# Patient Record
Sex: MALE | Race: WHITE | Employment: FULL TIME | URBAN - NONMETROPOLITAN AREA
[De-identification: names, ages, dates, MRNs, and addresses within clinical notes are randomized per-mention and may not be internally consistent; named-entity substitution may affect disease eponyms.]

---

## 2017-01-01 ENCOUNTER — HOSPITAL ENCOUNTER (OUTPATIENT)
Dept: PHARMACY | Age: 57
Discharge: HOME | End: 2017-11-15

## 2017-01-01 ENCOUNTER — HOSPITAL ENCOUNTER (OUTPATIENT)
Dept: PHARMACY | Age: 57
Setting detail: THERAPIES SERIES
Discharge: HOME | End: 2017-09-22
Payer: MEDICARE

## 2017-01-01 ENCOUNTER — HOSPITAL ENCOUNTER (OUTPATIENT)
Dept: PHARMACY | Age: 57
Setting detail: THERAPIES SERIES
Discharge: HOME | End: 2017-09-08
Payer: MEDICARE

## 2017-01-01 ENCOUNTER — HOSPITAL ENCOUNTER (OUTPATIENT)
Dept: PHARMACY | Age: 57
Setting detail: THERAPIES SERIES
Discharge: HOME | End: 2017-05-22
Payer: MEDICARE

## 2017-01-01 ENCOUNTER — HOSPITAL ENCOUNTER (OUTPATIENT)
Dept: PHARMACY | Age: 57
Setting detail: THERAPIES SERIES
Discharge: HOME | End: 2017-08-22
Payer: MEDICARE

## 2017-01-01 ENCOUNTER — HOSPITAL ENCOUNTER (OUTPATIENT)
Dept: PHARMACY | Age: 57
Setting detail: THERAPIES SERIES
Discharge: HOME | End: 2017-10-11
Payer: MEDICARE

## 2017-01-01 ENCOUNTER — HOSPITAL ENCOUNTER (OUTPATIENT)
Dept: PHARMACY | Age: 57
Setting detail: THERAPIES SERIES
Discharge: HOME | End: 2017-04-28
Payer: MEDICARE

## 2017-01-01 ENCOUNTER — HOSPITAL ENCOUNTER (OUTPATIENT)
Dept: PHARMACY | Age: 57
Setting detail: THERAPIES SERIES
Discharge: HOME | End: 2017-12-13
Payer: MEDICARE

## 2017-01-01 ENCOUNTER — HOSPITAL ENCOUNTER (OUTPATIENT)
Dept: PHARMACY | Age: 57
Discharge: HOME | End: 2017-12-06

## 2017-01-01 ENCOUNTER — HOSPITAL ENCOUNTER (OUTPATIENT)
Dept: PHARMACY | Age: 57
Setting detail: THERAPIES SERIES
Discharge: HOME | End: 2017-09-12
Payer: MEDICARE

## 2017-01-01 ENCOUNTER — HOSPITAL ENCOUNTER (OUTPATIENT)
Dept: PHARMACY | Age: 57
Setting detail: THERAPIES SERIES
Discharge: HOME | End: 2017-11-22
Payer: MEDICARE

## 2017-01-01 ENCOUNTER — TELEPHONE (OUTPATIENT)
Dept: PHARMACY | Age: 57
End: 2017-01-01

## 2017-01-01 ENCOUNTER — HOSPITAL ENCOUNTER (OUTPATIENT)
Dept: PHARMACY | Age: 57
Setting detail: THERAPIES SERIES
Discharge: HOME | End: 2017-09-29
Payer: MEDICARE

## 2017-01-01 ENCOUNTER — HOSPITAL ENCOUNTER (OUTPATIENT)
Age: 57
Discharge: HOME | End: 2017-09-25
Payer: MEDICARE

## 2017-01-01 ENCOUNTER — HOSPITAL ENCOUNTER (OUTPATIENT)
Dept: PHARMACY | Age: 57
Setting detail: THERAPIES SERIES
Discharge: HOME | End: 2017-05-01
Payer: MEDICARE

## 2017-01-01 ENCOUNTER — HOSPITAL ENCOUNTER (OUTPATIENT)
Dept: PHARMACY | Age: 57
Discharge: HOME | End: 2017-08-29

## 2017-01-01 ENCOUNTER — HOSPITAL ENCOUNTER (OUTPATIENT)
Dept: PHARMACY | Age: 57
Setting detail: THERAPIES SERIES
Discharge: HOME | End: 2017-09-27
Payer: MEDICARE

## 2017-01-01 ENCOUNTER — HOSPITAL ENCOUNTER (OUTPATIENT)
Dept: PHARMACY | Age: 57
Setting detail: THERAPIES SERIES
Discharge: HOME | End: 2017-06-29
Payer: MEDICARE

## 2017-01-01 ENCOUNTER — HOSPITAL ENCOUNTER (OUTPATIENT)
Dept: PHARMACY | Age: 57
Discharge: HOME | End: 2017-11-08

## 2017-01-01 ENCOUNTER — HOSPITAL ENCOUNTER (OUTPATIENT)
Dept: PHARMACY | Age: 57
Discharge: HOME | End: 2017-08-17

## 2017-01-01 ENCOUNTER — HOSPITAL ENCOUNTER (OUTPATIENT)
Dept: PHARMACY | Age: 57
Setting detail: THERAPIES SERIES
Discharge: HOME | End: 2017-10-19
Payer: MEDICARE

## 2017-01-01 ENCOUNTER — HOSPITAL ENCOUNTER (OUTPATIENT)
Dept: PHARMACY | Age: 57
Setting detail: THERAPIES SERIES
Discharge: HOME | End: 2017-04-27
Payer: MEDICARE

## 2017-01-01 ENCOUNTER — HOSPITAL ENCOUNTER (OUTPATIENT)
Dept: PHARMACY | Age: 57
Setting detail: THERAPIES SERIES
Discharge: HOME | End: 2017-03-23
Payer: MEDICARE

## 2017-01-01 ENCOUNTER — HOSPITAL ENCOUNTER (OUTPATIENT)
Dept: PHARMACY | Age: 57
Setting detail: THERAPIES SERIES
Discharge: HOME | End: 2017-09-15
Payer: MEDICARE

## 2017-01-01 ENCOUNTER — HOSPITAL ENCOUNTER (OUTPATIENT)
Dept: PHARMACY | Age: 57
Setting detail: THERAPIES SERIES
Discharge: HOME | End: 2017-11-01
Payer: MEDICARE

## 2017-01-01 ENCOUNTER — HOSPITAL ENCOUNTER (OUTPATIENT)
Dept: PHARMACY | Age: 57
Setting detail: THERAPIES SERIES
Discharge: HOME | End: 2017-09-18
Payer: MEDICARE

## 2017-01-01 ENCOUNTER — OFFICE VISIT (OUTPATIENT)
Dept: CARDIOLOGY CLINIC | Age: 57
End: 2017-01-01
Payer: MEDICARE

## 2017-01-01 ENCOUNTER — HOSPITAL ENCOUNTER (OUTPATIENT)
Age: 57
Discharge: HOME | End: 2017-09-16
Payer: MEDICARE

## 2017-01-01 ENCOUNTER — HOSPITAL ENCOUNTER (OUTPATIENT)
Dept: PHARMACY | Age: 57
Setting detail: THERAPIES SERIES
Discharge: HOME | End: 2017-12-20
Payer: MEDICARE

## 2017-01-01 ENCOUNTER — HOSPITAL ENCOUNTER (OUTPATIENT)
Dept: PHARMACY | Age: 57
Setting detail: THERAPIES SERIES
Discharge: HOME | End: 2017-08-07
Payer: MEDICARE

## 2017-01-01 ENCOUNTER — HOSPITAL ENCOUNTER (OUTPATIENT)
Dept: PHARMACY | Age: 57
Setting detail: THERAPIES SERIES
Discharge: HOME | End: 2017-11-29
Payer: MEDICARE

## 2017-01-01 ENCOUNTER — HOSPITAL ENCOUNTER (OUTPATIENT)
Dept: PHARMACY | Age: 57
Setting detail: THERAPIES SERIES
Discharge: HOME | End: 2017-12-27
Payer: MEDICARE

## 2017-01-01 ENCOUNTER — HOSPITAL ENCOUNTER (OUTPATIENT)
Age: 57
Setting detail: SPECIMEN
Discharge: HOME | End: 2017-10-24
Payer: MEDICARE

## 2017-01-01 ENCOUNTER — HOSPITAL ENCOUNTER (OUTPATIENT)
Dept: PHARMACY | Age: 57
Setting detail: THERAPIES SERIES
Discharge: HOME | End: 2017-10-25
Payer: MEDICARE

## 2017-01-01 ENCOUNTER — HOSPITAL ENCOUNTER (OUTPATIENT)
Dept: PHARMACY | Age: 57
Setting detail: THERAPIES SERIES
Discharge: HOME | End: 2017-10-04
Payer: MEDICARE

## 2017-01-01 ENCOUNTER — HOSPITAL ENCOUNTER (OUTPATIENT)
Dept: PHARMACY | Age: 57
Setting detail: THERAPIES SERIES
Discharge: HOME | End: 2017-06-08
Payer: MEDICARE

## 2017-01-01 VITALS
BODY MASS INDEX: 27.34 KG/M2 | DIASTOLIC BLOOD PRESSURE: 76 MMHG | SYSTOLIC BLOOD PRESSURE: 134 MMHG | HEART RATE: 72 BPM | WEIGHT: 196 LBS

## 2017-01-01 VITALS
WEIGHT: 212.4 LBS | HEART RATE: 72 BPM | DIASTOLIC BLOOD PRESSURE: 76 MMHG | SYSTOLIC BLOOD PRESSURE: 134 MMHG | BODY MASS INDEX: 27.95 KG/M2 | BODY MASS INDEX: 29.62 KG/M2 | HEART RATE: 74 BPM | WEIGHT: 200.4 LBS | SYSTOLIC BLOOD PRESSURE: 126 MMHG | DIASTOLIC BLOOD PRESSURE: 74 MMHG

## 2017-01-01 VITALS
HEART RATE: 76 BPM | BODY MASS INDEX: 27.48 KG/M2 | SYSTOLIC BLOOD PRESSURE: 110 MMHG | DIASTOLIC BLOOD PRESSURE: 60 MMHG | OXYGEN SATURATION: 97 % | WEIGHT: 197 LBS

## 2017-01-01 VITALS
WEIGHT: 210 LBS | SYSTOLIC BLOOD PRESSURE: 120 MMHG | DIASTOLIC BLOOD PRESSURE: 60 MMHG | HEART RATE: 80 BPM | OXYGEN SATURATION: 98 % | BODY MASS INDEX: 29.29 KG/M2

## 2017-01-01 VITALS
BODY MASS INDEX: 29.82 KG/M2 | WEIGHT: 213.8 LBS | SYSTOLIC BLOOD PRESSURE: 142 MMHG | HEART RATE: 76 BPM | DIASTOLIC BLOOD PRESSURE: 76 MMHG

## 2017-01-01 VITALS — WEIGHT: 203.6 LBS | BODY MASS INDEX: 28.4 KG/M2

## 2017-01-01 VITALS
BODY MASS INDEX: 28.93 KG/M2 | HEART RATE: 84 BPM | DIASTOLIC BLOOD PRESSURE: 78 MMHG | WEIGHT: 207.4 LBS | SYSTOLIC BLOOD PRESSURE: 146 MMHG

## 2017-01-01 VITALS
HEART RATE: 86 BPM | WEIGHT: 193 LBS | BODY MASS INDEX: 26.92 KG/M2 | SYSTOLIC BLOOD PRESSURE: 112 MMHG | DIASTOLIC BLOOD PRESSURE: 70 MMHG

## 2017-01-01 DIAGNOSIS — Z79.01 LONG TERM CURRENT USE OF ANTICOAGULANT THERAPY: ICD-10-CM

## 2017-01-01 DIAGNOSIS — I05.9 MITRAL VALVE DISORDER: ICD-10-CM

## 2017-01-01 DIAGNOSIS — I48.20 CHRONIC ATRIAL FIBRILLATION (HCC): ICD-10-CM

## 2017-01-01 DIAGNOSIS — E55.9 VITAMIN D DEFICIENCY DISEASE: ICD-10-CM

## 2017-01-01 DIAGNOSIS — I05.9 MITRAL VALVE DISEASE: ICD-10-CM

## 2017-01-01 DIAGNOSIS — E78.5 HYPERLIPIDEMIA, UNSPECIFIED HYPERLIPIDEMIA TYPE: Primary | ICD-10-CM

## 2017-01-01 DIAGNOSIS — Z95.810 AUTOMATIC IMPLANTABLE CARDIOVERTER-DEFIBRILLATOR IN SITU: ICD-10-CM

## 2017-01-01 DIAGNOSIS — E78.5 HYPERLIPIDEMIA, UNSPECIFIED HYPERLIPIDEMIA TYPE: ICD-10-CM

## 2017-01-01 DIAGNOSIS — Z79.01 LONG TERM (CURRENT) USE OF ANTICOAGULANTS: ICD-10-CM

## 2017-01-01 DIAGNOSIS — I48.20 CHRONIC ATRIAL FIBRILLATION (HCC): Primary | ICD-10-CM

## 2017-01-01 LAB
ABSOLUTE EOS #: 0.3 K/UL (ref 0–0.4)
ABSOLUTE LYMPH #: 1.4 K/UL (ref 1–4.8)
ABSOLUTE MONO #: 0.7 K/UL (ref 0–1)
ALBUMIN SERPL-MCNC: 3.4 G/DL (ref 3.5–5.2)
ALBUMIN SERPL-MCNC: 3.7 G/DL (ref 3.5–5.2)
ALBUMIN/GLOBULIN RATIO: ABNORMAL (ref 1–2.5)
ALP BLD-CCNC: 95 U/L (ref 40–129)
ALT SERPL-CCNC: 6 U/L (ref 5–41)
ANION GAP SERPL CALCULATED.3IONS-SCNC: 10 MMOL/L (ref 9–17)
ANION GAP SERPL CALCULATED.3IONS-SCNC: 10 MMOL/L (ref 9–17)
ANION GAP SERPL CALCULATED.3IONS-SCNC: 15 MMOL/L (ref 9–17)
AST SERPL-CCNC: 20 U/L
BASOPHILS # BLD: 0 %
BASOPHILS ABSOLUTE: 0 K/UL (ref 0–0.2)
BILIRUB SERPL-MCNC: 0.41 MG/DL (ref 0.3–1.2)
BUN BLDV-MCNC: 20 MG/DL (ref 6–20)
BUN BLDV-MCNC: 4 MG/DL (ref 6–20)
BUN BLDV-MCNC: 6 MG/DL (ref 6–20)
BUN/CREAT BLD: 23 (ref 9–20)
BUN/CREAT BLD: 7 (ref 9–20)
CALCIUM SERPL-MCNC: 9 MG/DL (ref 8.6–10.4)
CALCIUM SERPL-MCNC: 9.1 MG/DL (ref 8.6–10.4)
CALCIUM SERPL-MCNC: 9.8 MG/DL (ref 8.6–10.4)
CHLORIDE BLD-SCNC: 83 MMOL/L (ref 98–107)
CHLORIDE BLD-SCNC: 87 MMOL/L (ref 98–107)
CHLORIDE BLD-SCNC: 89 MMOL/L (ref 98–107)
CHOLESTEROL/HDL RATIO: 4.9
CHOLESTEROL: 138 MG/DL
CO2: 29 MMOL/L (ref 20–31)
CO2: 31 MMOL/L (ref 20–31)
CO2: 34 MMOL/L (ref 20–31)
CREAT SERPL-MCNC: 0.56 MG/DL (ref 0.7–1.2)
CREAT SERPL-MCNC: 0.57 MG/DL (ref 0.7–1.2)
CREAT SERPL-MCNC: 0.86 MG/DL (ref 0.7–1.2)
CREATININE URINE: 17.8 MG/DL (ref 39–259)
DIFFERENTIAL TYPE: YES
EKG ATRIAL RATE: 69 BPM
EKG Q-T INTERVAL: 430 MS
EKG QRS DURATION: 112 MS
EKG QTC CALCULATION (BAZETT): 460 MS
EKG T AXIS: 52 DEGREES
EKG VENTRICULAR RATE: 69 BPM
EOSINOPHILS RELATIVE PERCENT: 4 %
GFR AFRICAN AMERICAN: >60 ML/MIN
GFR NON-AFRICAN AMERICAN: >60 ML/MIN
GFR SERPL CREATININE-BSD FRML MDRD: ABNORMAL ML/MIN/{1.73_M2}
GLUCOSE BLD-MCNC: 101 MG/DL (ref 70–99)
GLUCOSE BLD-MCNC: 86 MG/DL (ref 70–99)
HCT VFR BLD CALC: 37.4 % (ref 41–53)
HDLC SERPL-MCNC: 28 MG/DL
HEMOGLOBIN: 12.6 G/DL (ref 13.5–17.5)
INR BLD: 1.2
INR BLD: 1.6
INR BLD: 1.7
INR BLD: 1.8
INR BLD: 2
INR BLD: 2
INR BLD: 2.2
INR BLD: 2.28
INR BLD: 2.3
INR BLD: 2.3
INR BLD: 2.4
INR BLD: 3.9
INR BLD: 4.09
INR BLD: 5.8
INR BLD: 7.8
INTERNATIONAL NORMALIZATION RATIO, POC: 1.2
INTERNATIONAL NORMALIZATION RATIO, POC: 1.3
INTERNATIONAL NORMALIZATION RATIO, POC: 1.42
INTERNATIONAL NORMALIZATION RATIO, POC: 1.42
INTERNATIONAL NORMALIZATION RATIO, POC: 1.6
INTERNATIONAL NORMALIZATION RATIO, POC: 1.6
INTERNATIONAL NORMALIZATION RATIO, POC: 1.7
INTERNATIONAL NORMALIZATION RATIO, POC: 1.7
INTERNATIONAL NORMALIZATION RATIO, POC: 1.9
INTERNATIONAL NORMALIZATION RATIO, POC: 2.3
INTERNATIONAL NORMALIZATION RATIO, POC: 2.3
INTERNATIONAL NORMALIZATION RATIO, POC: 2.5
INTERNATIONAL NORMALIZATION RATIO, POC: 2.7
INTERNATIONAL NORMALIZATION RATIO, POC: 2.7
INTERNATIONAL NORMALIZATION RATIO, POC: 2.8
INTERNATIONAL NORMALIZATION RATIO, POC: 2.9
INTERNATIONAL NORMALIZATION RATIO, POC: 2.9
INTERNATIONAL NORMALIZATION RATIO, POC: 5.4
LDL CHOLESTEROL: 84 MG/DL (ref 0–130)
LYMPHOCYTES # BLD: 22 %
MAGNESIUM: 1.9 MG/DL (ref 1.6–2.6)
MCH RBC QN AUTO: 33.3 PG (ref 26–34)
MCHC RBC AUTO-ENTMCNC: 33.8 G/DL (ref 31–37)
MCV RBC AUTO: 98.3 FL (ref 80–100)
MONOCYTES # BLD: 11 %
OSMOLALITY URINE: 125 MOSM/KG (ref 80–1300)
PATIENT FASTING?: YES
PDW BLD-RTO: 13.4 % (ref 12.1–15.2)
PHOSPHORUS: 3.3 MG/DL (ref 2.5–4.5)
PLATELET # BLD: 288 K/UL (ref 140–450)
PLATELET ESTIMATE: ABNORMAL
PMV BLD AUTO: ABNORMAL FL (ref 6–12)
POTASSIUM SERPL-SCNC: 3.6 MMOL/L (ref 3.7–5.3)
POTASSIUM SERPL-SCNC: 3.9 MMOL/L (ref 3.7–5.3)
POTASSIUM SERPL-SCNC: 4.8 MMOL/L (ref 3.7–5.3)
PROTHROMBIN TIME, POC: 15.1
RBC # BLD: 3.8 M/UL (ref 4.5–5.9)
RBC # BLD: ABNORMAL 10*6/UL
SEG NEUTROPHILS: 63 %
SEGMENTED NEUTROPHILS ABSOLUTE COUNT: 4.1 K/UL (ref 2.1–6.5)
SERUM OSMOLALITY: 282 MOSM/KG (ref 275–295)
SODIUM BLD-SCNC: 127 MMOL/L (ref 135–144)
SODIUM BLD-SCNC: 130 MMOL/L (ref 135–144)
SODIUM BLD-SCNC: 131 MMOL/L (ref 135–144)
SODIUM,UR: 21 MMOL/L
TOTAL PROTEIN: 7.5 G/DL (ref 6.4–8.3)
TRIGL SERPL-MCNC: 131 MG/DL
TSH SERPL DL<=0.05 MIU/L-ACNC: 1.37 MIU/L (ref 0.3–5)
VITAMIN D 25-HYDROXY: 48.6 NG/ML (ref 30–100)
VLDLC SERPL CALC-MCNC: ABNORMAL MG/DL (ref 1–30)
WBC # BLD: 6.5 K/UL (ref 3.5–11)
WBC # BLD: ABNORMAL 10*3/UL

## 2017-01-01 PROCEDURE — 82310 ASSAY OF CALCIUM: CPT

## 2017-01-01 PROCEDURE — 83735 ASSAY OF MAGNESIUM: CPT

## 2017-01-01 PROCEDURE — 93005 ELECTROCARDIOGRAM TRACING: CPT

## 2017-01-01 PROCEDURE — 82306 VITAMIN D 25 HYDROXY: CPT

## 2017-01-01 PROCEDURE — 85610 PROTHROMBIN TIME: CPT

## 2017-01-01 PROCEDURE — 80061 LIPID PANEL: CPT

## 2017-01-01 PROCEDURE — 36415 COLL VENOUS BLD VENIPUNCTURE: CPT

## 2017-01-01 PROCEDURE — 80053 COMPREHEN METABOLIC PANEL: CPT

## 2017-01-01 PROCEDURE — 99214 OFFICE O/P EST MOD 30 MIN: CPT | Performed by: INTERNAL MEDICINE

## 2017-01-01 PROCEDURE — 3017F COLORECTAL CA SCREEN DOC REV: CPT | Performed by: INTERNAL MEDICINE

## 2017-01-01 PROCEDURE — 93289 INTERROG DEVICE EVAL HEART: CPT | Performed by: INTERNAL MEDICINE

## 2017-01-01 PROCEDURE — 82570 ASSAY OF URINE CREATININE: CPT

## 2017-01-01 PROCEDURE — 80051 ELECTROLYTE PANEL: CPT

## 2017-01-01 PROCEDURE — 83930 ASSAY OF BLOOD OSMOLALITY: CPT

## 2017-01-01 PROCEDURE — 84100 ASSAY OF PHOSPHORUS: CPT

## 2017-01-01 PROCEDURE — 84520 ASSAY OF UREA NITROGEN: CPT

## 2017-01-01 PROCEDURE — G0463 HOSPITAL OUTPT CLINIC VISIT: HCPCS

## 2017-01-01 PROCEDURE — 99211 OFF/OP EST MAY X REQ PHY/QHP: CPT

## 2017-01-01 PROCEDURE — 82040 ASSAY OF SERUM ALBUMIN: CPT

## 2017-01-01 PROCEDURE — 84443 ASSAY THYROID STIM HORMONE: CPT

## 2017-01-01 PROCEDURE — 85025 COMPLETE CBC W/AUTO DIFF WBC: CPT

## 2017-01-01 PROCEDURE — 83935 ASSAY OF URINE OSMOLALITY: CPT

## 2017-01-01 PROCEDURE — 84300 ASSAY OF URINE SODIUM: CPT

## 2017-01-01 PROCEDURE — 4004F PT TOBACCO SCREEN RCVD TLK: CPT | Performed by: INTERNAL MEDICINE

## 2017-01-01 PROCEDURE — 80048 BASIC METABOLIC PNL TOTAL CA: CPT

## 2017-01-01 PROCEDURE — G8427 DOCREV CUR MEDS BY ELIG CLIN: HCPCS | Performed by: INTERNAL MEDICINE

## 2017-01-01 PROCEDURE — 82565 ASSAY OF CREATININE: CPT

## 2017-01-01 PROCEDURE — G8417 CALC BMI ABV UP PARAM F/U: HCPCS | Performed by: INTERNAL MEDICINE

## 2017-01-01 RX ORDER — WARFARIN SODIUM 4 MG/1
4 TABLET ORAL SEE ADMIN INSTRUCTIONS
COMMUNITY
End: 2017-01-01 | Stop reason: DRUGHIGH

## 2017-01-01 RX ORDER — POLYETHYLENE GLYCOL 3350 17 G/17G
17 POWDER, FOR SOLUTION ORAL DAILY
COMMUNITY
End: 2017-01-01 | Stop reason: ALTCHOICE

## 2017-01-01 RX ORDER — WARFARIN SODIUM 4 MG/1
TABLET ORAL
Qty: 30 TABLET | Refills: 0 | Status: SHIPPED
Start: 2017-01-01

## 2017-01-01 RX ORDER — LEVOTHYROXINE SODIUM 0.05 MG/1
50 TABLET ORAL DAILY
COMMUNITY
Start: 2017-01-01

## 2017-01-01 RX ORDER — FENTANYL 100 UG/H
1 PATCH TRANSDERMAL
COMMUNITY

## 2017-01-01 RX ORDER — MULTIVIT-MIN/IRON/FOLIC ACID/K 18-400-25
1 TABLET ORAL DAILY
COMMUNITY
End: 2017-01-01 | Stop reason: ALTCHOICE

## 2017-01-01 RX ORDER — CLOPIDOGREL BISULFATE 75 MG/1
75 TABLET ORAL DAILY
COMMUNITY

## 2017-01-01 RX ORDER — AMOXICILLIN AND CLAVULANATE POTASSIUM 875; 125 MG/1; MG/1
1 TABLET, FILM COATED ORAL 2 TIMES DAILY
COMMUNITY
Start: 2017-01-01 | End: 2017-01-01

## 2017-01-01 RX ORDER — CEPHALEXIN 500 MG/1
1000 CAPSULE ORAL 2 TIMES DAILY
Refills: 0 | COMMUNITY
Start: 2017-01-01 | End: 2017-01-01 | Stop reason: HOSPADM

## 2017-01-01 RX ORDER — BUMETANIDE 1 MG/1
1 TABLET ORAL 2 TIMES DAILY
COMMUNITY

## 2017-01-01 RX ORDER — EPLERENONE 50 MG/1
25 TABLET, FILM COATED ORAL DAILY
Qty: 30 TABLET | Refills: 3 | Status: SHIPPED | OUTPATIENT
Start: 2017-01-01 | End: 2017-01-01 | Stop reason: ALTCHOICE

## 2017-01-01 RX ORDER — THIAMINE MONONITRATE (VIT B1) 100 MG
100 TABLET ORAL DAILY
COMMUNITY

## 2017-01-01 RX ORDER — DOXAZOSIN 2 MG/1
2 TABLET ORAL DAILY
COMMUNITY

## 2017-01-01 RX ORDER — FLUTICASONE FUROATE AND VILANTEROL 100; 25 UG/1; UG/1
1 POWDER RESPIRATORY (INHALATION) DAILY
COMMUNITY

## 2017-01-01 RX ORDER — CARVEDILOL 25 MG/1
12.5 TABLET ORAL 2 TIMES DAILY WITH MEALS
COMMUNITY

## 2017-01-01 RX ORDER — ACAMPROSATE CALCIUM 333 MG/1
666 TABLET, DELAYED RELEASE ORAL 3 TIMES DAILY
COMMUNITY

## 2017-01-01 RX ORDER — SPIRONOLACTONE 100 MG/1
200 TABLET, FILM COATED ORAL DAILY
Refills: 1 | COMMUNITY
Start: 2017-01-01

## 2017-01-01 RX ORDER — WARFARIN SODIUM 3 MG/1
TABLET ORAL
Qty: 30 TABLET | Refills: 3 | Status: SHIPPED
Start: 2017-01-01 | End: 2017-01-01 | Stop reason: DRUGHIGH

## 2017-01-01 RX ORDER — ASPIRIN 81 MG/1
81 TABLET ORAL DAILY
COMMUNITY

## 2017-01-01 RX ORDER — POTASSIUM CHLORIDE 750 MG/1
10 TABLET, FILM COATED, EXTENDED RELEASE ORAL DAILY
COMMUNITY
End: 2017-01-01 | Stop reason: SDUPTHER

## 2017-01-01 RX ORDER — DOXAZOSIN 2 MG/1
2 TABLET ORAL NIGHTLY
Qty: 30 TABLET | Refills: 3 | Status: SHIPPED | OUTPATIENT
Start: 2017-01-01 | End: 2017-01-01 | Stop reason: ALTCHOICE

## 2017-01-01 RX ORDER — M-VIT,TX,IRON,MINS/CALC/FOLIC 27MG-0.4MG
1 TABLET ORAL DAILY
COMMUNITY

## 2017-01-01 RX ORDER — AMITRIPTYLINE HYDROCHLORIDE 100 MG/1
100 TABLET, FILM COATED ORAL NIGHTLY
COMMUNITY

## 2017-01-01 RX ORDER — SPIRONOLACTONE 25 MG/1
50 TABLET ORAL DAILY
COMMUNITY
End: 2017-01-01 | Stop reason: ALTCHOICE

## 2017-01-01 RX ORDER — FOLIC ACID 1 MG/1
1 TABLET ORAL DAILY
COMMUNITY
Start: 2017-01-01

## 2017-01-01 RX ORDER — WARFARIN SODIUM 4 MG/1
TABLET ORAL
Qty: 90 TABLET | Refills: 1 | Status: SHIPPED
Start: 2017-01-01 | End: 2017-01-01 | Stop reason: SDUPTHER

## 2017-01-01 RX ORDER — MAGNESIUM OXIDE 400 MG/1
400 TABLET ORAL 2 TIMES DAILY
COMMUNITY

## 2017-01-01 RX ORDER — PREDNISONE 20 MG/1
20 TABLET ORAL SEE ADMIN INSTRUCTIONS
COMMUNITY

## 2017-01-01 RX ORDER — NICOTINE 21 MG/24HR
1 PATCH, TRANSDERMAL 24 HOURS TRANSDERMAL EVERY 24 HOURS
COMMUNITY
End: 2017-01-01

## 2017-01-01 RX ORDER — NITROGLYCERIN 0.4 MG/1
0.4 TABLET SUBLINGUAL EVERY 5 MIN PRN
COMMUNITY

## 2017-01-01 RX ORDER — POTASSIUM CHLORIDE 750 MG/1
10 TABLET, FILM COATED, EXTENDED RELEASE ORAL DAILY
Qty: 60 TABLET | Refills: 11 | Status: SHIPPED | OUTPATIENT
Start: 2017-01-01

## 2017-01-01 RX ORDER — SPIRONOLACTONE 50 MG/1
50 TABLET, FILM COATED ORAL DAILY
COMMUNITY
Start: 2017-01-01 | End: 2017-01-01 | Stop reason: ALTCHOICE

## 2017-01-01 RX ORDER — BUPROPION HYDROCHLORIDE 300 MG/1
300 TABLET ORAL DAILY
COMMUNITY
End: 2017-01-01 | Stop reason: ALTCHOICE

## 2017-01-30 ENCOUNTER — NURSE ONLY (OUTPATIENT)
Dept: CARDIOLOGY | Facility: CLINIC | Age: 57
End: 2017-01-30

## 2017-01-30 DIAGNOSIS — Z95.810 CARDIAC DEFIBRILLATOR IN PLACE: Primary | ICD-10-CM

## 2017-01-30 PROCEDURE — 93288 INTERROG EVL PM/LDLS PM IP: CPT | Performed by: INTERNAL MEDICINE

## 2017-09-25 NOTE — MR AVS SNAPSHOT
After Visit Summary             Prashanth Post   2017 12:00 PM   Office Visit    Description:  Male : 1960   Provider:  Ravi Ly MD   Department:  [facility] Cardiology Specialist              Your Follow-Up and Future Appointments         Below is a list of your follow-up and future appointments. This may not be a complete list as you may have made appointments directly with providers that we are not aware of or your providers may have made some for you. Please call your providers to confirm appointments. It is important to keep your appointments. Please bring your current insurance card, photo ID, co-pay, and all medication bottles to your appointment. If self-pay, payment is expected at the time of service. Your To-Do List     Future Appointments Provider Department Dept Phone    2017 5:00 AM [address] [facility] Medication Manangement [phone]    2018 2:00 PM Ravi Ly MD [facility] Cardiology Specialist [phone]    If this is a fasting lab, please do not eat or drink past midnight other than water.     2018 12:30 PM Ravi Ly MD [facility] Cardiology Specialist [phone]    Future Orders Complete By Expires    CBC Auto Differential [[id number] Custom]  10/25/2018 2018    Comprehensive Metabolic Panel [PUW05 Custom]  10/25/2018 2018    EKG 12 Lead [EKG1 Custom]  10/25/2018 2018    Lipid Panel [LAB18 Custom]  10/25/2018 2018    Magnesium [IPD380 Custom]  10/25/2018 2018    TSH with Reflex [[id number] Custom]  10/25/2018 2018    Vitamin D 25 Hydroxy [FPM385 Custom]  10/25/2018 2018    XR CHEST STANDARD (2 VW) [02103 Custom]  10/25/2018 2018         Information from Your Visit        Department     Name Address Phone [facility] Cardiology Specialist [address] [place] [address] [phone]      You Were Seen for:         Comments Hyperlipidemia, unspecified hyperlipidemia type   [2538432]         Vital Signs     Blood Pressure Pulse Weight Oxygen Saturation Body Mass Index Smoking Status    120/60 80 210 lb (95.3 kg) 98% 29.29 kg/m2 Current Every Day Smoker      Additional Information about your Body Mass Index (BMI)           Your BMI as listed above is considered overweight (25.0-29.9). BMI is an estimate of body fat, calculated from your height and weight. The higher your BMI, the greater your risk of heart disease, high blood pressure, type 2 diabetes, stroke, gallstones, arthritis, sleep apnea, and certain cancers. BMI is not perfect. It may overestimate body fat in athletes and people who are more muscular. If your body fat is high you can improve your BMI by decreasing your calorie intake and becoming more physically active.      Learn more at: [email or web address]             Medications and Orders      Your Current Medications Are              bumetanide (BUMEX) 1 MG tablet Take 1 mg by mouth 2 times daily    magnesium oxide (MAG-OX) 400 MG tablet Take 400 mg by mouth 2 times daily    warfarin (COUMADIN) 4 MG tablet Take 4 mg by mouth See Admin Instructions Takes 4 mg every Mon and Friday and 6 mg daily all other days as directed by [facility] - Medication Management    potassium chloride (KLOR-CON) 10 MEQ extended release tablet Take 1 tablet by mouth daily    acamprosate (CAMPRAL) 333 MG tablet Take 666 mg by mouth 3 times daily    Cholecalciferol (VITAMIN D3) 5000 units CAPS Take 1 capsule by mouth Twice a Week On Wednesdays and Sundays    doxazosin (CARDURA) 2 MG tablet Take 2 mg by mouth daily    fluticasone-vilanterol (BREO ELLIPTA) 100-25 MCG/INH AEPB inhaler Inhale 1 puff into the lungs daily    Multiple Vitamins-Minerals (THERAPEUTIC MULTIVITAMIN-MINERALS) tablet Take 1 tablet by mouth daily    nitroGLYCERIN (NITROSTAT) 0.4 MG SL tablet Place 0.4 mg under the tongue 1960 Male White Non-/Non  English      Problem List as of 9/25/2017  Date Reviewed: 7/25/2016                Automatic implantable cardioverter-defibrillator in situ    Hyperlipidemia      Preventive Care        Date Due    Hepatitis C screening is recommended for all adults regardless of risk factors born between [facility] at least once (lifetime) who have never been tested. 1960    HIV screening is recommended for all people regardless of risk factors  aged 15-65 years at least once (lifetime) who have never been HIV tested. 4/20/1973    Tetanus Combination Vaccine (1 - Tdap) 4/20/1977    Pneumococcal Vaccine - Pneumovax for adults aged 19-64 years with: chronic heart disease, chronic lung disease, diabetes mellitus, alcoholism, chronic liver disease, or cigarette smoking. (1 of 1 - PPSV23) 4/20/1977    Colon Cancer Stool Test 6/2/2016    Yearly Flu Vaccine (1) 9/1/2017    Cholesterol Screening 7/7/2021            MyChart Signup           Our records indicate that you have declined MyChart signup.

## 2017-09-29 NOTE — PROGRESS NOTES
Fingerstick INR drawn per patient self-test and result delivered to us via fax. All communication is via telephone with patient's daughter and caregiver, Michael Santos states her dad has not reported any visible blood in urine and no black tarry stool. As noted above, Magdanathan Kc went to see Dr. Alireza Pinto, his PCP, earlier today and will start Wellbutrin  mg daily, Cephalexin 1000 mg BID x 10 days, and restart his SPIRONOLACTONE 100 mg BID (which can decrease INR). Following his INR of 5.4 on 9-, Bethel's warfarin dosage was skipped for the past 2 days as instructed prior to this INR check today. No change in other maintenance medications or in diet, although Rosalba Cira says he is \"back to drinking as much alcohol as he had been previously (which she estimates anywhere from \"20 to 40 beers per day\"). For now, we will restart his warfarin and continue with a weekly dosage comparable to the doses he has taken over the course of the past 7 days. We will have him recheck his INR again in 5 days, on Wednesday, 10-4-2017.

## 2017-10-11 NOTE — PROGRESS NOTES
Fingerstick INR drawn per patient self-test and result delivered to us via fax. All communication is via telephone with patient. Patient states no visible blood in urine and no black tarry stool, and Augusta states Romie Danielle denies any nose bleeds or coughing up any blood. Denies any missed doses of warfarin. No change in diet. Romie Danielle continues on augmentin 875 mg BID x 14 days since discharge from [facility] on 10/3/17. Since Bethel's INR is subtherapeutic again today, we will have him take warfarin 4.5 mg today, then increase current weekly warfarin regimen by 14.3% (his INR target range 2.5 to 3.5) and recheck INR via self-test in 1 week(s).

## 2017-10-19 NOTE — PROGRESS NOTES
Fingerstick INR drawn per patient self-test and result delivered to us via fax. All communication is via telephone with patient's daughter, Zurdo Pompa. Patient states no visible blood in urine and no black tarry stool. Saint Gardener was taken to [facility] ER on 10/15/17 for altered mental status and hyponatremia. Bethel's INR upon admission was 4.09. Bethel missed 3 doses of warfarin on 10/15, 10/16 and 10/18. Saint Gardener was drinking approx. \"15 beers per day before admission\", but hasn't had any since discharge home yesterday according to Zurdo Pompa. Augusta states Saint Gardener has 2 doses of augmentin left and will finish last dose tomorrow AM. Since Bethel's INR is subtherapeutic today and has had 3 missed dose in the last week. We will have him take warfarin 4 mg x 2 doses, then 3 mg daily x 4 doses and recheck INR via self-test in 6 day(s).

## 2017-10-25 NOTE — PATIENT INSTRUCTIONS
Increase current dose of warfarin as instructed on dosing calendar provided. Continue to monitor urine and stool for signs and symptoms of bleeding. Please notify the clinic of any medication changes. Please remember to bring all medications (both prescription and OTC) to your next visit. Kindly notify the clinic if you are unable to make to your next appointment.

## 2017-10-25 NOTE — PROGRESS NOTES
Fingerstick INR drawn per patient self-test and result delivered to us via fax. All communication is via telephone with patient. Patient states no visible blood in urine and no black tarry stool. Denies any missed doses of warfarin. No change in diet. According to Tara Dre has only had \"one tall boy beer,\" since he has been home from the hospital. Augusta states Bethel's \"chest sounded like crap. \" He was prescribed levaquin 750 mg daily and prednisone 60 mg daily x 5 days, and took his last dose of both this morning. Augusta states Guillermo Waterman is better, but seems weak. She states Guillermo Waterman is not eating a lot, but is drinking V8 splash berry blend and Arie's berry juice, about 8 ounces x 8 servings per day. Since Bethel's INR is slightly subtherapeutic, we will have him take warfarin 4 mg tonight and then increase current weekly warfarin regimen by 10% and recheck INR via self-test in 1 week(s).

## 2017-11-08 NOTE — PROGRESS NOTES
Fingerstick INR drawn per patient self-test and result delivered to us via fax. All communication is via telephone with patient's daughter, Berto Ordonez. She denies any reports of visible blood in urine and no black tarry stool. Denies any missed doses of warfarin. All medications reviewed for accuracy. She says that he took his last dose of Levaquin on 11-5-2017 (3 days ago). No change in other maintenance medications or in diet. According to Luiz Aschoff has not been drinking \"as much\" alcohol as he had been previously and estimates that he may have had \"about 18\" beers over the course of the past week. Since we are targeting an INR of 2.5 - 3.5 given PMH significant for MVR, we will increase current weekly warfarin regimen (8%) as compared to the dosage he has taken over the course of the past 7 days. We will have Augusta recheck his INR via self-test again in 1 week, on Wednesday, 11-.

## 2017-11-15 NOTE — PROGRESS NOTES
Fingerstick INR drawn per patient self-test and result delivered to us via fax. All communication is via telephone with patient's daughter, Hedy Chung. She denies any reports of visible blood in urine and no black tarry stool. Denies any missed doses of warfarin. All medications reviewed for accuracy. No change in other maintenance medications or in diet. According to Rosa Neo still is not drinking \"as much\" alcohol as he had been previously and estimates that he had \"about 18\" beers again over the course of the past week. Since we are targeting an INR of 2.5 - 3.5 given PMH significant for MVR, we will continue current weekly warfarin regimen as compared to the dosage he has taken over the course of the past 7 days. We will have Augusta recheck his INR via self-test again in 1 week, on Wednesday, 11-.

## 2017-12-06 NOTE — PROGRESS NOTES
Fingerstick INR drawn per patient self-test and result delivered to us via fax. All communication is via telephone with patient's daughter, Mamta Andino. She denies any reports of visible blood in urine and no black tarry stool. Denies any missed doses of warfarin. All medications reviewed for accuracy. No change in other maintenance medications or in diet. Since we are targeting an INR of 2.5 - 3.5 given PMH significant for MVR, we will continue current weekly warfarin regimen as compared to the dosage he has taken over the course of the past 7 days. We will have Augusta recheck his INR via self-test again in 1 week, on Wednesday, 12-.

## 2018-01-01 ENCOUNTER — HOSPITAL ENCOUNTER (OUTPATIENT)
Dept: PHARMACY | Age: 58
Setting detail: THERAPIES SERIES
Discharge: HOME | End: 2018-01-04
Payer: MEDICARE

## 2018-01-01 DIAGNOSIS — I48.20 CHRONIC ATRIAL FIBRILLATION (HCC): ICD-10-CM

## 2018-01-01 DIAGNOSIS — Z79.01 LONG TERM CURRENT USE OF ANTICOAGULANT THERAPY: ICD-10-CM

## 2018-01-01 DIAGNOSIS — I05.9 MITRAL VALVE DISORDER: ICD-10-CM

## 2018-01-01 LAB — INTERNATIONAL NORMALIZATION RATIO, POC: 2.1

## 2018-01-01 RX ORDER — FUROSEMIDE 40 MG/1
40 TABLET ORAL 2 TIMES DAILY
COMMUNITY

## 2018-01-04 NOTE — PROGRESS NOTES
Fingerstick INR drawn per patient self-test and result delivered to us via fax. All communication is via telephone with patient's daughter, Martinez Mijares. She denies any reports of visible blood in urine and no black tarry stool, although he still complains of \"spitting up blood\". She says he had a swallow test at [facility] yesterday and was found to be aspirating \"about 50%\" of all his \"food and liquid\" intake. He had an emergency CT and was found to have a mass in his throat. He was instructed to take his last dose of warfarin tonight and his last dose of Plavix tomorrow. He is to be admitted to [facility] on Saturday, 1-6-2018 and started on a heparin drip prior to scheduled biopsy of his throat on Tuesday, 1-9-2018 per . Bill Barber also says that he was instructed to start taking Lasix 40 mg BID and continue taking Spironolactone 200 mg daily at this time. All other medications reviewed for accuracy. No change in other maintenance medications or in diet. Since we are targeting an INR of 2.5 - 3.5 given PMH significant for MVR, we will have Sohan Burger take 6 mg warfarin tonight x 1 dose prior to stopping pre-procedurally as instructed. We will reassess further warfarin dosing after he has been discharged from [facility] post-procedurally.

## 2025-01-17 NOTE — PROGRESS NOTES
April Garcia M.D. [address] [place] [address], [place] [address]  [phone]          2017          Leatha Cristina. Thedore Severance, M.D.  [address] [facility] [[email or web address]]on, [address] [place] [address] Drive          RE:  Deyanira Sprague   : 1960    Dear Dr. Thedore Severance:    CHIEF COMPLAINT:  Fatigue. HISTORY OF PRESENT ILLNESS:  Mr. Zelaya is a pleasant 82-BSRB-PLS admitted alcoholic, who has a history of coronary artery disease and rheumatic valvular disease. He had a stent placed in his right coronary artery in . He had a repeat catheterization by Dr. Jonathan Huston on 2008, which showed severe mitral regurgitation and patent stent, circumflex had 80% to 90% disease, LAD unremarkable. He had mitral valve replacement on 2008, placing [employer] bioprosthesis in mitral position. He had a vein graft to the OM of the circumflex and a Marinelli-Maze procedure. His ICD is a [employer]. He is in chronic atrial fibrillation. He had a generator change on 10/18/2010, with a [employer] Vitality ICD at beginning of life. He had ventricular fibrillation on 2016, with a shot. He has a tremendous alcohol history. He is extremely inactive. His daughter, Dean Quinn, fills his medications. Mentation is very poor, very slow to respond to questions. He forgot about his appointment today and I called his ex-wife, who brought him to our office. He went [facility] on [facility], when a daughter saw him and he was initially unresponsive, but then became responsive. The following day, he did not respond and a neighbor found him on the floor and called the squad. He was brought to ED and found to have elevated troponin. He was transferred to [facility]. He had a subsequent cardiac catheterization on 2017. LAD was unremarkable. Circumflex had 70% distally.   The right coronary artery had severe 80% in-stent stenosis in the proximal normal limits. LABORATORY DATA:  From 07/25/2017, sodium 130, potassium 3.9, BUN 4, creatinine 0.57. GFR greater than 60. Magnesium 1.9, calcium 9.0. Cholesterol 138, HDL 28 with an LDL of 84, triglycerides 131. ALT was 6 and AST was 20. TSH 1.37. Vitamin D 48.6. White count 6.5, hemoglobin 12.6, with a platelet count of 883,380. EKG showed chronic atrial fibrillation with nonspecific ST changes, incomplete right bundle-branch block. Chest x-ray was unremarkable. ICD was interrogated and still at beginning of life. IMPRESSION:  1. Acute coronary artery syndrome on 07/14/2017, with a catheterization showing 90% disease in his right coronary artery with angioplasty placing 3.5 x 18 mm Alpine stent. EF was 50%. His bypass graft to the OM is patent. He had normal LAD. 2.  Alcoholism. 3.  Seizure on 02/41/6104, due to metabolic derangement from alcoholism. 4.  No ICD by interrogation today. 5.  Hypertension. 6.  CAD. 7.  Stent to the right coronary artery in 2000.  8.  Sudden cardiac death in 2007, placed an ICD with [employer]. 9.  DVT in 2004, on Coumadin since that time. 10.  Catheterization on 03/03/2008, showing severe MR with 80% circumflex, patent right coronary artery and unremarkable LAD, EF of 55%. 11.  Open heart surgery by Dr. Yasmin Gay on 03/20/2008, placing [employer] bioprosthesis in mitral position with a vein graft to the OM branch of the circumflex and Marinelli-Maze procedure. 12. ICD changed on 10/18/2010, with a [address] 233 generator with still at beginning of life. 13.  A 60% to 70% left internal carotid artery disease. 14.  Spontaneous ventricular tachycardia on 05/27/2016, requiring cardioversion, which he underwent _____. 15.  Unable to take statins. 16.  Chronic atrial fibrillation, on Coumadin. 17.  History of falling although none since his last angioplasty on 07/14/2017, by Dr. Cesia Cornejo in [facility]. PLAN:  1.   Continue same No